# Patient Record
(demographics unavailable — no encounter records)

---

## 2025-03-07 NOTE — PHYSICAL EXAM
[Chaperone Present] : A chaperone was present in the examining room during all aspects of the physical examination [Normal] : cervix [No Bleeding] : there was no active vaginal bleeding [Tenderness] : tender [Enlarged ___ wks] : enlarged [unfilled] ~Uweeks [Uterine Adnexae] : were not tender and not enlarged [FreeTextEntry2] : Lucy Ramirez

## 2025-07-18 NOTE — HISTORY OF PRESENT ILLNESS
[FreeTextEntry1] : 28-year-old -0-0-3 sexually active with Nexplanon in place presents with pelvic pain left greater than right.  She denies vaginal discharge or itching.

## 2025-07-18 NOTE — DISCUSSION/SUMMARY
[FreeTextEntry1] : 28-year-old G3, P3 with Nexplanon in place complains of chronic pelvic pain left greater than right.  Physical exam shows tender pelvis.  Treatment options discussed including diagnostic laparoscopy.  Patient to be booked for diagnostic laparoscopy.  All questions answered.

## 2025-07-18 NOTE — PHYSICAL EXAM
[Chaperoned Physical Exam] : A chaperone was present in the examining room during all aspects of the physical examination. [FreeTextEntry2] : Lucy [Labia Majora] : normal [Labia Minora] : normal [Normal] : normal [Tenderness] : tender [Enlarged ___ wks] : enlarged [unfilled] ~Uweeks [Adnexa Tenderness On The Left] : tender